# Patient Record
Sex: FEMALE | Race: WHITE | NOT HISPANIC OR LATINO | URBAN - METROPOLITAN AREA
[De-identification: names, ages, dates, MRNs, and addresses within clinical notes are randomized per-mention and may not be internally consistent; named-entity substitution may affect disease eponyms.]

---

## 2023-04-20 ENCOUNTER — OFFICE (OUTPATIENT)
Dept: URBAN - METROPOLITAN AREA CLINIC 10 | Facility: CLINIC | Age: 58
End: 2023-04-20

## 2023-04-20 VITALS
HEART RATE: 76 BPM | WEIGHT: 167 LBS | DIASTOLIC BLOOD PRESSURE: 80 MMHG | OXYGEN SATURATION: 96 % | SYSTOLIC BLOOD PRESSURE: 140 MMHG

## 2023-04-20 DIAGNOSIS — L29.0 PRURITUS ANI: ICD-10-CM

## 2023-04-20 DIAGNOSIS — K62.89 OTHER SPECIFIED DISEASES OF ANUS AND RECTUM: ICD-10-CM

## 2023-04-20 PROCEDURE — 99204 OFFICE O/P NEW MOD 45 MIN: CPT | Performed by: INTERNAL MEDICINE

## 2023-04-20 RX ORDER — LACTOBACILLUS CASEI/FOLIC ACID 60-1.25 MG
CAPSULE ORAL
Qty: 30 | Refills: 1 | Status: ACTIVE
Start: 2023-04-20

## 2023-04-20 RX ORDER — FLUCONAZOLE 150 MG/1
TABLET ORAL
Qty: 2 | Refills: 1 | Status: ACTIVE
Start: 2023-04-20

## 2023-04-20 RX ORDER — HYDROCORTISONE 25 MG/G
CREAM TOPICAL
Qty: 10 | Refills: 3 | Status: ACTIVE
Start: 2023-04-20

## 2023-04-20 NOTE — SERVICEHPINOTES
Pt  C/O rectal itching  discomfort  after BMs.  No H/O  blood in stool.  Pt had  negative  screening colonoscopy  in 2022 by Dr. Pacheco, Nystatin cream helped temporarily  2 grav, 2 para,  No recent  antibiotic  therapy. No recent travel  history.   No weight  loss.  loss,  Pt  partial hysterectomy  in 2006.  No recent hot flashes.  Pt had food sensitivity test  which showed   reaction to almonds, pinapple and black beans,